# Patient Record
Sex: FEMALE | Race: WHITE | NOT HISPANIC OR LATINO | Employment: OTHER | URBAN - METROPOLITAN AREA
[De-identification: names, ages, dates, MRNs, and addresses within clinical notes are randomized per-mention and may not be internally consistent; named-entity substitution may affect disease eponyms.]

---

## 2017-02-04 ENCOUNTER — HOSPITAL ENCOUNTER (EMERGENCY)
Facility: HOSPITAL | Age: 82
Discharge: HOME | End: 2017-02-04
Attending: EMERGENCY MEDICINE
Payer: MEDICARE

## 2017-02-04 VITALS
WEIGHT: 190 LBS | DIASTOLIC BLOOD PRESSURE: 73 MMHG | TEMPERATURE: 97 F | BODY MASS INDEX: 34.96 KG/M2 | RESPIRATION RATE: 18 BRPM | HEART RATE: 85 BPM | SYSTOLIC BLOOD PRESSURE: 144 MMHG | OXYGEN SATURATION: 98 % | HEIGHT: 62 IN

## 2017-02-04 DIAGNOSIS — S01.01XA SCALP LACERATION, INITIAL ENCOUNTER: Primary | ICD-10-CM

## 2017-02-04 LAB
BASOPHILS # BLD AUTO: 0.1 K/UL
BASOPHILS NFR BLD: 0.9 %
DIFFERENTIAL METHOD: ABNORMAL
EOSINOPHIL # BLD AUTO: 0.2 K/UL
EOSINOPHIL NFR BLD: 1 %
ERYTHROCYTE [DISTWIDTH] IN BLOOD BY AUTOMATED COUNT: 15 %
HCT VFR BLD AUTO: 41.6 %
HGB BLD-MCNC: 13.4 G/DL
INR PPP: 3.1
LYMPHOCYTES # BLD AUTO: 3.1 K/UL
LYMPHOCYTES NFR BLD: 19.9 %
MCH RBC QN AUTO: 27.9 PG
MCHC RBC AUTO-ENTMCNC: 32.2 %
MCV RBC AUTO: 86 FL
MONOCYTES # BLD AUTO: 1.2 K/UL
MONOCYTES NFR BLD: 7.6 %
NEUTROPHILS # BLD AUTO: 11 K/UL
NEUTROPHILS NFR BLD: 70.6 %
PLATELET # BLD AUTO: 223 K/UL
PMV BLD AUTO: 9.4 FL
PROTHROMBIN TIME: 30.9 SEC
RBC # BLD AUTO: 4.81 M/UL
WBC # BLD AUTO: 15.5 K/UL

## 2017-02-04 PROCEDURE — 25000003 PHARM REV CODE 250

## 2017-02-04 PROCEDURE — 90471 IMMUNIZATION ADMIN: CPT | Performed by: EMERGENCY MEDICINE

## 2017-02-04 PROCEDURE — 85025 COMPLETE CBC W/AUTO DIFF WBC: CPT

## 2017-02-04 PROCEDURE — 63600175 PHARM REV CODE 636 W HCPCS: Performed by: EMERGENCY MEDICINE

## 2017-02-04 PROCEDURE — 99284 EMERGENCY DEPT VISIT MOD MDM: CPT | Mod: 25

## 2017-02-04 PROCEDURE — 85610 PROTHROMBIN TIME: CPT

## 2017-02-04 PROCEDURE — 36415 COLL VENOUS BLD VENIPUNCTURE: CPT

## 2017-02-04 PROCEDURE — 90715 TDAP VACCINE 7 YRS/> IM: CPT | Performed by: EMERGENCY MEDICINE

## 2017-02-04 PROCEDURE — 12001 RPR S/N/AX/GEN/TRNK 2.5CM/<: CPT

## 2017-02-04 RX ORDER — BACITRACIN ZINC 500 UNIT/G
OINTMENT IN PACKET (EA) TOPICAL 2 TIMES DAILY
Status: COMPLETED | OUTPATIENT
Start: 2017-02-04 | End: 2017-02-04

## 2017-02-04 RX ORDER — BACITRACIN ZINC 500 UNIT/G
OINTMENT IN PACKET (EA) TOPICAL
Status: COMPLETED
Start: 2017-02-04 | End: 2017-02-04

## 2017-02-04 RX ADMIN — BACITRACIN ZINC: 500 OINTMENT TOPICAL at 05:02

## 2017-02-04 RX ADMIN — CLOSTRIDIUM TETANI TOXOID ANTIGEN (FORMALDEHYDE INACTIVATED), CORYNEBACTERIUM DIPHTHERIAE TOXOID ANTIGEN (FORMALDEHYDE INACTIVATED), BORDETELLA PERTUSSIS TOXOID ANTIGEN (GLUTARALDEHYDE INACTIVATED), BORDETELLA PERTUSSIS FILAMENTOUS HEMAGGLUTININ ANTIGEN (FORMALDEHYDE INACTIVATED), BORDETELLA PERTUSSIS PERTACTIN ANTIGEN, AND BORDETELLA PERTUSSIS FIMBRIAE 2/3 ANTIGEN 0.5 ML: 5; 2; 2.5; 5; 3; 5 INJECTION, SUSPENSION INTRAMUSCULAR at 04:02

## 2017-02-04 RX ADMIN — Medication: at 05:02

## 2017-02-04 NOTE — ED AVS SNAPSHOT
[facility] [address] [place] [address]               Kathia Astudillo   2017  2:59 PM   ED    Description:  Female : 1/10/1926   Department:  [facility]           Your Care was Coordinated By:     Provider Role From To    Santiago Kuhn MD Attending Provider 17 1459 --      Reason for Visit     Head Laceration           Diagnoses this Visit        Comments    Scalp laceration, initial encounter    -  Primary       ED Disposition     ED Disposition Condition Comment    Discharge             To Do List           Follow-up Information     Follow up with Robel Maciel MD In 10 days.    Specialty:  Family Medicine    Why:  For suture removal    Contact information:    [address]  [facility] [address]  [phone]          Follow up with [facility].    Specialty:  Emergency Medicine    Why:  As needed, If symptoms worsen    Contact information:    [address] [place] [id number] [phone]      [facility]     [facility] Care [facility] -  Assistance  Registered nurses in the [facility] provide clinical advisement, health education, appointment booking, and other advisory services.  Call for this free service at [phone].             Medications           Message regarding Medications     Verify the changes and/or additions to your medication regime listed below are the same as discussed with your clinician today.  If any of these changes or additions are incorrect, please notify your healthcare provider.        These medications were administered today        Dose Freq    bacitracin zinc ointment  2 times daily    Sig: Apply topically 2 (two) times daily.    Class: Normal    Route: Topical (Top)    Tdap vaccine injection 0.5 mL 0.5 mL ED 1 Time    Sig: Inject 0.5 mLs into the muscle ED 1 Time.    Class: Normal    Route: Intramuscular    bacitracin zinc 500  "unit/gram ointment      Notes to Pharmacy: Created by cabinet override           Verify that the below list of medications is an accurate representation of the medications you are currently taking.  If none reported, the list may be blank. If incorrect, please contact your healthcare provider. Carry this list with you in case of emergency.           Current Medications     acetaminophen (TYLENOL) 325 MG tablet Take 2 tablets (650 mg total) by mouth every 6 (six) hours as needed for Temperature greater than (or equal to 101 degree F).    ADVAIR DISKUS 250-50 mcg/dose diskus inhaler     atenolol (TENORMIN) 25 MG tablet Take 1 tablet (25 mg total) by mouth once daily.    bacitracin zinc 500 unit/gram ointment     bacitracin zinc ointment Apply topically 2 (two) times daily.    docusate sodium (COLACE) 100 MG capsule Take 1 capsule (100 mg total) by mouth 2 (two) times daily.    donepezil (ARICEPT) 5 MG tablet Take 1 tablet (5 mg total) by mouth once daily.    fluticasone-vilanterol (BREO) 100-25 mcg/dose diskus inhaler Inhale 1 puff into the lungs once daily.    levothyroxine (SYNTHROID) 100 MCG tablet Take 100 mcg by mouth before breakfast.    ondansetron HCl, PF, 4 mg/2 mL Soln Inject 4 mg into the vein every 8 (eight) hours as needed.    pantoprazole (PROTONIX) 40 MG tablet Take 1 tablet (40 mg total) by mouth once daily.    senna-docusate 8.6-50 mg (PERICOLACE) 8.6-50 mg per tablet Take 1 tablet by mouth 2 (two) times daily as needed for Constipation.    warfarin (COUMADIN) 5 MG tablet Take 4.5 mg by mouth once daily. Recent increase to 7.5 mg           Clinical Reference Information           Your Vitals Were     BP Pulse Temp Resp Height Weight    144/73 85 97.4 °F (36.3 °C) (Oral) 18 5' 2" (1.575 m) 86.2 kg (190 lb)    Last Period SpO2 BMI          04[birth year]1950 (Approximate) 98% 34.75 kg/m2        Allergies as of 2/4/2017        Reactions    Pcn [Penicillins] Swelling    Demerol (Pf) [Meperidine (Pf)] Rash    " Ibuprofen Rash      Immunizations Administered on Date of Encounter - 2/4/2017     Name Date Dose VIS Date Route    TDAP 2/4/2017 0.5 mL 2/24/2015 Intramuscular      ED Micro, Lab, POCT     Start Ordered       Status Ordering Provider    02/04/17 1501 02/04/17 1500  Protime-INR  STAT      Final result     02/04/17 1501 02/04/17 1500  CBC auto differential  STAT      Final result       ED Imaging Orders     Start Ordered       Status Ordering Provider    02/04/17 1501 02/04/17 1500  CT Head Without Contrast  1 time imaging      Final result       Discharge References/Attachments     LACERATION, SCALP: SUTURES OR STAPLES (ENGLISH)      [facility] Sign-Up     Activating your [facility] account is as easy as 1-2-3!     1) Visit [email or web address], select Sign Up Now, enter this activation code and your date of birth, then select Next.  [email or web address]  Expires: 3/21/2017  5:21 PM      2) Create a username and password to use when you visit [facility] in the future and select a security question in case you lose your password and select Next.    3) Enter your e-mail address and click Sign Up!    Additional Information  If you have questions, please e-mail [email or web address] or call [phone] to talk to our [facility] staff. Remember, [facility] is NOT to be used for urgent needs. For medical emergencies, dial 911.          [facility] complies with applicable Federal civil rights laws and does not discriminate on the basis of race, color, national origin, age, disability, or sex.        Language Assistance Services     ATTENTION: Language assistance services are available, free of charge. Please call [phone].      ATENCIÓN: Si habla español, tiene a okeefe disposición servicios gratuitos de asistencia lingüística. Llame al [phone].     CHÚ Ý: N?u b?n nói Ti?ng Vi?t, có các d?ch v? h? tr? ngôn ng? mi?n phí dành cho b?n. G?i s? [phone].

## 2017-02-04 NOTE — ED NOTES
Posterior scalp laceration after slip and fall. Denies LOC. Bleeding controlled. Pt is ambulating without difficulty

## 2017-02-04 NOTE — ED PROVIDER NOTES
Encounter Date: 2/4/2017    SCRIBE #1 NOTE: IKayleigh, am scribing for, and in the presence of, Dr Kuhn.       History     Chief Complaint   Patient presents with    Head Laceration     slip and fall     Review of patient's allergies indicates:   Allergen Reactions    Pcn [penicillins] Swelling    Demerol (pf) [meperidine (pf)] Rash    Ibuprofen Rash     HPI Comments: 02/04/2017  3:27 PM     Chief Complaint: Head Laceration      Kathia Astudillo is a 89 y.o. female with a pmhx of Anticoagulant long-term use; Arthritis; Asthma;  Diverticulitis; DVT; Hypertension; and Thyroid disease presenting to the E.D. with an acute onset of a head laceration which occurred prior to arrival today. She suffered mechanical slip and fall when attempting to step out of the shower. Denies LOC. No other complaints and she denies hip pain, HA.  Pt has a past surgical history that includes Tonsillectomy; Gallbladder surgery; Hysterectomy; Shoulder surgery (Left); and Colon surgery.      The history is provided by the patient.     Past Medical History   Diagnosis Date    Anticoagulant long-term use     Arthritis     Asthma     Delayed wound healing     Diverticulitis      abscess that ruptured    DVT (deep venous thrombosis)     Hypertension     Thyroid disease      No past medical history pertinent negatives.  Past Surgical History   Procedure Laterality Date    Tonsillectomy      Gallbladder surgery      Hysterectomy      Shoulder surgery Left      rotator cuff repair    Colon surgery       colectomy with ostomy     Family History   Problem Relation Age of Onset    Collagen disease Neg Hx      Social History   Substance Use Topics    Smoking status: Never Smoker    Smokeless tobacco: Never Used    Alcohol use No     Review of Systems   Constitutional: Negative for fever.   HENT: Negative for sore throat.    Eyes: Negative for visual disturbance.   Respiratory: Negative for cough.    Cardiovascular: Negative for  chest pain.   Gastrointestinal: Negative for abdominal pain, diarrhea, nausea and vomiting.   Genitourinary: Negative for difficulty urinating and pelvic pain.   Musculoskeletal: Negative for arthralgias.   Skin: Positive for wound. Negative for rash.   Neurological: Negative for syncope, weakness and headaches.       Physical Exam   Initial Vitals   BP Pulse Resp Temp SpO2   02/04/17 1410 02/04/17 1410 02/04/17 1410 02/04/17 1410 02/04/17 1410   144/73 85 18 97.4 °F (36.3 °C) 98 %     Physical Exam    Nursing note and vitals reviewed.  Constitutional: She appears well-developed.   HENT:   Head: Normocephalic. Head is with laceration.   Mouth/Throat: Oropharynx is clear and moist.   1cm occipital scalp laceration. Galea is intact.    Eyes: Conjunctivae are normal.   Neck: Neck supple.   Cardiovascular: Normal rate, regular rhythm and intact distal pulses. Exam reveals no gallop and no friction rub.    Murmur heard.   Systolic murmur is present   Pulmonary/Chest: Breath sounds normal. She has no wheezes. She has no rhonchi. She has no rales.   Abdominal: Soft. She exhibits no distension. There is no tenderness.   Musculoskeletal: Normal range of motion.   Neurological: She is alert and oriented to person, place, and time.   Skin: No rash noted. No erythema.   Psychiatric: She has a normal mood and affect.         ED Course   Lac Repair  Date/Time: 2/4/2017 5:01 PM  Performed by: JONI ALSTON.  Authorized by: JONI ALSTON.   Consent Done: Yes  Risks and benefits: risks, benefits and alternatives were discussed  Body area: head/neck  Location details: scalp  Laceration length: 2 cm  Preparation: Patient was prepped and draped in the usual sterile fashion.  Amount of cleaning: standard  Skin closure: staples  Number of sutures: 3  Patient tolerance: Patient tolerated the procedure well with no immediate complications        Labs Reviewed   PROTIME-INR - Abnormal; Notable for the following:        Result Value     Prothrombin Time 30.9 (*)     INR 3.1 (*)     All other components within normal limits   CBC W/ AUTO DIFFERENTIAL - Abnormal; Notable for the following:     WBC 15.50 (*)     RDW 15.0 (*)     Gran # 11.0 (*)     Mono # 1.2 (*)     All other components within normal limits             Medical Decision Making:   History:   Old Medical Records: I decided to obtain old medical records.  Initial Assessment:   89-year-old woman with mechanical fall and posterior head trauma with scalp laceration.  CT head negative.  No neck tenderness or pain.  Normal neurological exam.  Scalp was repaired with 3 staples.  Hemostasis achieved.  Patient to apply antibiotic ointment.  Wound care precautions discussed.  Patient to return for any worsening symptoms.  Follow up in 10 days for suture removal.            Scribe Attestation:   Scribe #1: I performed the above scribed service and the documentation accurately describes the services I performed. I attest to the accuracy of the note.    Attending Attestation:           Physician Attestation for Scribe:  Physician Attestation Statement for Scribe #1: I, Dr Kuhn, reviewed documentation, as scribed by Kayleigh Zamarripa in my presence, and it is both accurate and complete.                 ED Course     Clinical Impression:   The encounter diagnosis was Scalp laceration, initial encounter.          Santiago Kuhn MD  02/04/17 1167

## 2017-08-09 ENCOUNTER — OFFICE VISIT (OUTPATIENT)
Dept: ORTHOPEDICS | Facility: CLINIC | Age: 82
End: 2017-08-09
Payer: MEDICARE

## 2017-08-09 VITALS
DIASTOLIC BLOOD PRESSURE: 74 MMHG | SYSTOLIC BLOOD PRESSURE: 128 MMHG | HEIGHT: 62 IN | WEIGHT: 190 LBS | HEART RATE: 118 BPM | BODY MASS INDEX: 34.96 KG/M2

## 2017-08-09 DIAGNOSIS — M48.061 LUMBAR SPINAL STENOSIS: Primary | ICD-10-CM

## 2017-08-09 DIAGNOSIS — M54.32 SCIATICA OF LEFT SIDE WITHOUT BACK PAIN: Primary | ICD-10-CM

## 2017-08-09 DIAGNOSIS — M48.061 SPINAL STENOSIS OF LUMBAR REGION WITH RADICULOPATHY: ICD-10-CM

## 2017-08-09 DIAGNOSIS — M54.32 LEFT SIDED SCIATICA: ICD-10-CM

## 2017-08-09 DIAGNOSIS — M54.16 SPINAL STENOSIS OF LUMBAR REGION WITH RADICULOPATHY: ICD-10-CM

## 2017-08-09 PROCEDURE — 3008F BODY MASS INDEX DOCD: CPT | Mod: ,,, | Performed by: ORTHOPAEDIC SURGERY

## 2017-08-09 PROCEDURE — 99203 OFFICE O/P NEW LOW 30 MIN: CPT | Mod: ,,, | Performed by: ORTHOPAEDIC SURGERY

## 2017-08-09 PROCEDURE — 1159F MED LIST DOCD IN RCRD: CPT | Mod: ,,, | Performed by: ORTHOPAEDIC SURGERY

## 2017-08-09 PROCEDURE — 1157F ADVNC CARE PLAN IN RCRD: CPT | Mod: ,,, | Performed by: ORTHOPAEDIC SURGERY

## 2017-08-09 RX ORDER — ATENOLOL 25 MG/1
1 TABLET ORAL DAILY
COMMUNITY
Start: 2017-06-26

## 2017-08-09 RX ORDER — METHYLPREDNISOLONE 4 MG/1
TABLET ORAL
Qty: 1 PACKAGE | Refills: 0 | Status: SHIPPED | OUTPATIENT
Start: 2017-08-09 | End: 2018-07-09

## 2017-08-09 NOTE — PROGRESS NOTES
Subjective:       Chief Complaint    Chief Complaint   Patient presents with    NP, Left Hip Pain     Pain started approx. in mid 7/2017 w/ no knowm trauma.  Previous eval & xray by Dr. Maciel on 8/3/17.  Made worse by any movement.  Dr. Maciel gave her a Toradol injection which helped for only a few hours. No groin pain or radiation down the leg.       HPI  Kathia Astudillo is a 89 y.o.  female who presents With three-week history of pain in her left hip area. First-time episode. Denies low back pain. No history of trauma. Pain level can reach 7/10.      Past History  Past Medical History:   Diagnosis Date    Anticoagulant long-term use     Arthritis     Asthma     Delayed wound healing     Diverticulitis     abscess that ruptured    DVT (deep venous thrombosis)     Hypertension     Thyroid disease      Past Surgical History:   Procedure Laterality Date    COLON SURGERY      colectomy with ostomy    GALLBLADDER SURGERY      HYSTERECTOMY      SHOULDER SURGERY Left     rotator cuff repair    TONSILLECTOMY       Social History     Social History    Marital status:      Spouse name: N/A    Number of children: N/A    Years of education: N/A     Occupational History    Not on file.     Social History Main Topics    Smoking status: Never Smoker    Smokeless tobacco: Never Used    Alcohol use No    Drug use: No    Sexual activity: Not on file     Other Topics Concern    Not on file     Social History Narrative    No narrative on file         Medications  Current Outpatient Prescriptions   Medication Sig    ADVAIR DISKUS 250-50 mcg/dose diskus inhaler Inhale 1 puff into the lungs once daily.     atenolol (TENORMIN) 25 MG tablet Take 1 tablet by mouth once daily.    DIPHENHYDRAMINE HCL (SLEEP ORAL) Take 1 tablet by mouth nightly as needed (Z-Quill).    levothyroxine (SYNTHROID) 100 MCG tablet Take 100 mcg by mouth before breakfast.    warfarin (COUMADIN) 5 MG tablet Take 4.5 mg by mouth  once daily. Recent increase to 7.5 mg     No current facility-administered medications for this visit.        Allergies  Review of patient's allergies indicates:   Allergen Reactions    Pcn [penicillins] Swelling    Demerol (pf) [meperidine (pf)] Rash    Ibuprofen Rash         Review of Systems     Constitutional: Negative    HENT: Negative  Eyes: Negative  Respiratory: Negative  Cardiovascular: Negative  Musculoskeletal: HPI  Skin: Negative  Neurological: Negative  Hematological: Negative  Endocrine: Negative      Physical Exam    Vitals:    08/09/17 0836   BP: 128/74   Pulse: (!) 118     Physical Examination:Lumbar spine examination reveals forward flexion 90° with knee sprain. Pulse for tender in the left gluteal sciatic notch area. Nontender paralumbar lumbosacral region. Mild tenderness in the right gluteal area. Unable to heel toe walk. Some tenderness over the trochanteric bursa. Quantity of tissue in the area. External rotation 35°, internal rotation 20°. Hip flexion past 90 in the seated position. Moderately weak dorsiflexors on the left. Good dorsiflexor strength on the right. At some mild discomfort with terminal internal rotation on the left.     Skin-clear  General appearance -  well appearing, and in no distress  Mental status - awake  Neck - supple  Chest -  symmetric air entry  Heart - normal rate   Abdomen - soft      Assessment/Plan   Sciatica of left side without back pain    Spinal stenosis of lumbar region with radiculopathy    X-ray examination of the left hip shows no evidence of fracture or dislocation. The cartilage space appears well-maintained. X-rays of the lumbar spine will be done at [facility] imaging today.    Medrol Dosepak prescribed. Advised not to ambulate without a walker in front of her. X-rays of the lumbar spine will be done at . imaging today.    This note was dictated using voice recognition software and may contain grammatical errors.

## 2018-07-09 ENCOUNTER — HOSPITAL ENCOUNTER (OUTPATIENT)
Facility: HOSPITAL | Age: 83
LOS: 1 days | Discharge: HOSPICE | End: 2018-07-11
Attending: EMERGENCY MEDICINE | Admitting: INTERNAL MEDICINE
Payer: MEDICARE

## 2018-07-09 DIAGNOSIS — A41.9 SEVERE SEPSIS: ICD-10-CM

## 2018-07-09 DIAGNOSIS — Z93.3 COLOSTOMY STATUS: ICD-10-CM

## 2018-07-09 DIAGNOSIS — M79.89 PAIN AND SWELLING OF LEFT LOWER LEG: Primary | ICD-10-CM

## 2018-07-09 DIAGNOSIS — M79.662 PAIN AND SWELLING OF LEFT LOWER LEG: Primary | ICD-10-CM

## 2018-07-09 DIAGNOSIS — E03.9 HYPOTHYROIDISM, UNSPECIFIED TYPE: ICD-10-CM

## 2018-07-09 DIAGNOSIS — L03.116 CELLULITIS OF LEFT LOWER EXTREMITY: ICD-10-CM

## 2018-07-09 DIAGNOSIS — R65.20 SEVERE SEPSIS: ICD-10-CM

## 2018-07-09 LAB
ALBUMIN SERPL BCP-MCNC: 3 G/DL
ALP SERPL-CCNC: 50 U/L
ALT SERPL W/O P-5'-P-CCNC: 20 U/L
ANION GAP SERPL CALC-SCNC: 11 MMOL/L
AST SERPL-CCNC: 28 U/L
BASOPHILS # BLD AUTO: 0 K/UL
BASOPHILS NFR BLD: 0 %
BILIRUB SERPL-MCNC: 1.3 MG/DL
BUN SERPL-MCNC: 19 MG/DL
CALCIUM SERPL-MCNC: 8.4 MG/DL
CHLORIDE SERPL-SCNC: 103 MMOL/L
CO2 SERPL-SCNC: 17 MMOL/L
CREAT SERPL-MCNC: 0.9 MG/DL
CRP SERPL-MCNC: 14.5 MG/L
DIFFERENTIAL METHOD: ABNORMAL
EOSINOPHIL # BLD AUTO: 0 K/UL
EOSINOPHIL NFR BLD: 0.1 %
ERYTHROCYTE [DISTWIDTH] IN BLOOD BY AUTOMATED COUNT: 16.5 %
EST. GFR  (AFRICAN AMERICAN): >60 ML/MIN/1.73 M^2
EST. GFR  (NON AFRICAN AMERICAN): 56 ML/MIN/1.73 M^2
GLUCOSE SERPL-MCNC: 100 MG/DL
HCT VFR BLD AUTO: 37.9 %
HGB BLD-MCNC: 12.3 G/DL
LACTATE SERPL-SCNC: 2.9 MMOL/L
LYMPHOCYTES # BLD AUTO: 0.6 K/UL
LYMPHOCYTES NFR BLD: 4.8 %
MCH RBC QN AUTO: 29.1 PG
MCHC RBC AUTO-ENTMCNC: 32.4 G/DL
MCV RBC AUTO: 90 FL
MONOCYTES # BLD AUTO: 0.5 K/UL
MONOCYTES NFR BLD: 4.3 %
NEUTROPHILS # BLD AUTO: 11.6 K/UL
NEUTROPHILS NFR BLD: 90.8 %
PLATELET # BLD AUTO: 124 K/UL
PMV BLD AUTO: 10 FL
POTASSIUM SERPL-SCNC: 4.7 MMOL/L
PROT SERPL-MCNC: 5.7 G/DL
RBC # BLD AUTO: 4.21 M/UL
SODIUM SERPL-SCNC: 131 MMOL/L
WBC # BLD AUTO: 12.8 K/UL

## 2018-07-09 PROCEDURE — 25000003 PHARM REV CODE 250: Performed by: EMERGENCY MEDICINE

## 2018-07-09 PROCEDURE — 96365 THER/PROPH/DIAG IV INF INIT: CPT

## 2018-07-09 PROCEDURE — 96367 TX/PROPH/DG ADDL SEQ IV INF: CPT

## 2018-07-09 PROCEDURE — 85610 PROTHROMBIN TIME: CPT

## 2018-07-09 PROCEDURE — 99285 EMERGENCY DEPT VISIT HI MDM: CPT | Mod: 25

## 2018-07-09 PROCEDURE — S0077 INJECTION, CLINDAMYCIN PHOSP: HCPCS | Performed by: EMERGENCY MEDICINE

## 2018-07-09 PROCEDURE — 87040 BLOOD CULTURE FOR BACTERIA: CPT | Mod: 59

## 2018-07-09 PROCEDURE — 80053 COMPREHEN METABOLIC PANEL: CPT

## 2018-07-09 PROCEDURE — 85025 COMPLETE CBC W/AUTO DIFF WBC: CPT

## 2018-07-09 PROCEDURE — 36415 COLL VENOUS BLD VENIPUNCTURE: CPT

## 2018-07-09 PROCEDURE — 86140 C-REACTIVE PROTEIN: CPT

## 2018-07-09 PROCEDURE — 83605 ASSAY OF LACTIC ACID: CPT

## 2018-07-09 RX ORDER — CLINDAMYCIN PHOSPHATE 600 MG/50ML
600 INJECTION, SOLUTION INTRAVENOUS
Status: COMPLETED | OUTPATIENT
Start: 2018-07-09 | End: 2018-07-10

## 2018-07-09 RX ADMIN — CLINDAMYCIN IN 5 PERCENT DEXTROSE 600 MG: 12 INJECTION, SOLUTION INTRAVENOUS at 11:07

## 2018-07-09 RX ADMIN — SODIUM CHLORIDE 1000 ML: 0.9 INJECTION, SOLUTION INTRAVENOUS at 11:07

## 2018-07-10 PROBLEM — Z66 DNR (DO NOT RESUSCITATE): Status: ACTIVE | Noted: 2018-07-10

## 2018-07-10 PROBLEM — Z91.81 HISTORY OF FALLING: Status: ACTIVE | Noted: 2018-07-10

## 2018-07-10 PROBLEM — Z86.718 HISTORY OF DVT (DEEP VEIN THROMBOSIS): Status: ACTIVE | Noted: 2018-07-10

## 2018-07-10 PROBLEM — M79.662 PAIN AND SWELLING OF LEFT LOWER LEG: Status: ACTIVE | Noted: 2018-07-10

## 2018-07-10 PROBLEM — A41.9 SEPSIS: Status: ACTIVE | Noted: 2018-07-10

## 2018-07-10 PROBLEM — I50.9 CHF (CONGESTIVE HEART FAILURE): Status: ACTIVE | Noted: 2018-07-10

## 2018-07-10 PROBLEM — M79.89 PAIN AND SWELLING OF LEFT LOWER LEG: Status: ACTIVE | Noted: 2018-07-10

## 2018-07-10 PROBLEM — L03.116 CELLULITIS OF LEFT LOWER EXTREMITY: Status: ACTIVE | Noted: 2018-07-10

## 2018-07-10 LAB
ALBUMIN SERPL BCP-MCNC: 2.7 G/DL
ALP SERPL-CCNC: 45 U/L
ALT SERPL W/O P-5'-P-CCNC: 18 U/L
ANION GAP SERPL CALC-SCNC: 6 MMOL/L
AST SERPL-CCNC: 22 U/L
BASOPHILS # BLD AUTO: 0 K/UL
BASOPHILS NFR BLD: 0.3 %
BILIRUB SERPL-MCNC: 1.2 MG/DL
BUN SERPL-MCNC: 18 MG/DL
CALCIUM SERPL-MCNC: 8.2 MG/DL
CHLORIDE SERPL-SCNC: 102 MMOL/L
CO2 SERPL-SCNC: 24 MMOL/L
CREAT SERPL-MCNC: 0.9 MG/DL
DIFFERENTIAL METHOD: ABNORMAL
EOSINOPHIL # BLD AUTO: 0 K/UL
EOSINOPHIL NFR BLD: 0.3 %
ERYTHROCYTE [DISTWIDTH] IN BLOOD BY AUTOMATED COUNT: 16 %
EST. GFR  (AFRICAN AMERICAN): >60 ML/MIN/1.73 M^2
EST. GFR  (NON AFRICAN AMERICAN): 56 ML/MIN/1.73 M^2
GLUCOSE SERPL-MCNC: 126 MG/DL
HCT VFR BLD AUTO: 31.9 %
HGB BLD-MCNC: 10.5 G/DL
INR PPP: 2.2
INR PPP: 2.4
LACTATE SERPL-SCNC: 0.9 MMOL/L
LYMPHOCYTES # BLD AUTO: 0.8 K/UL
LYMPHOCYTES NFR BLD: 5.9 %
MAGNESIUM SERPL-MCNC: 1.7 MG/DL
MCH RBC QN AUTO: 29.4 PG
MCHC RBC AUTO-ENTMCNC: 32.8 G/DL
MCV RBC AUTO: 90 FL
MONOCYTES # BLD AUTO: 0.5 K/UL
MONOCYTES NFR BLD: 3.7 %
NEUTROPHILS # BLD AUTO: 12.2 K/UL
NEUTROPHILS NFR BLD: 89.8 %
PHOSPHATE SERPL-MCNC: 3.5 MG/DL
PLATELET # BLD AUTO: 122 K/UL
PMV BLD AUTO: 9.1 FL
POTASSIUM SERPL-SCNC: 4.2 MMOL/L
PROT SERPL-MCNC: 5.4 G/DL
PROTHROMBIN TIME: 21.7 SEC
PROTHROMBIN TIME: 24.5 SEC
RBC # BLD AUTO: 3.56 M/UL
SODIUM SERPL-SCNC: 132 MMOL/L
TSH SERPL DL<=0.005 MIU/L-ACNC: 1.65 UIU/ML
WBC # BLD AUTO: 13.5 K/UL

## 2018-07-10 PROCEDURE — 27000221 HC OXYGEN, UP TO 24 HOURS

## 2018-07-10 PROCEDURE — 84100 ASSAY OF PHOSPHORUS: CPT

## 2018-07-10 PROCEDURE — 36415 COLL VENOUS BLD VENIPUNCTURE: CPT

## 2018-07-10 PROCEDURE — 99900035 HC TECH TIME PER 15 MIN (STAT)

## 2018-07-10 PROCEDURE — 63600175 PHARM REV CODE 636 W HCPCS: Performed by: EMERGENCY MEDICINE

## 2018-07-10 PROCEDURE — 85025 COMPLETE CBC W/AUTO DIFF WBC: CPT

## 2018-07-10 PROCEDURE — G0378 HOSPITAL OBSERVATION PER HR: HCPCS

## 2018-07-10 PROCEDURE — 84443 ASSAY THYROID STIM HORMONE: CPT

## 2018-07-10 PROCEDURE — S0077 INJECTION, CLINDAMYCIN PHOSP: HCPCS | Performed by: NURSE PRACTITIONER

## 2018-07-10 PROCEDURE — 25000003 PHARM REV CODE 250: Performed by: EMERGENCY MEDICINE

## 2018-07-10 PROCEDURE — 25000003 PHARM REV CODE 250: Performed by: NURSE PRACTITIONER

## 2018-07-10 PROCEDURE — 83735 ASSAY OF MAGNESIUM: CPT

## 2018-07-10 PROCEDURE — 83605 ASSAY OF LACTIC ACID: CPT

## 2018-07-10 PROCEDURE — 80053 COMPREHEN METABOLIC PANEL: CPT

## 2018-07-10 PROCEDURE — 99220 PR INITIAL OBSERVATION CARE,LEVL III: CPT | Mod: GW,,, | Performed by: INTERNAL MEDICINE

## 2018-07-10 PROCEDURE — 85610 PROTHROMBIN TIME: CPT

## 2018-07-10 PROCEDURE — 94761 N-INVAS EAR/PLS OXIMETRY MLT: CPT

## 2018-07-10 RX ORDER — CLINDAMYCIN PHOSPHATE 600 MG/50ML
600 INJECTION, SOLUTION INTRAVENOUS
Status: DISCONTINUED | OUTPATIENT
Start: 2018-07-10 | End: 2018-07-11 | Stop reason: HOSPADM

## 2018-07-10 RX ORDER — SODIUM CHLORIDE 0.9 % (FLUSH) 0.9 %
5 SYRINGE (ML) INJECTION
Status: DISCONTINUED | OUTPATIENT
Start: 2018-07-10 | End: 2018-07-11 | Stop reason: HOSPADM

## 2018-07-10 RX ORDER — IPRATROPIUM BROMIDE AND ALBUTEROL SULFATE 2.5; .5 MG/3ML; MG/3ML
3 SOLUTION RESPIRATORY (INHALATION) EVERY 4 HOURS PRN
Status: DISCONTINUED | OUTPATIENT
Start: 2018-07-10 | End: 2018-07-11 | Stop reason: HOSPADM

## 2018-07-10 RX ORDER — ACETAMINOPHEN 325 MG/1
650 TABLET ORAL EVERY 6 HOURS PRN
Status: DISCONTINUED | OUTPATIENT
Start: 2018-07-10 | End: 2018-07-11 | Stop reason: HOSPADM

## 2018-07-10 RX ORDER — ATENOLOL 25 MG/1
25 TABLET ORAL DAILY
Status: DISCONTINUED | OUTPATIENT
Start: 2018-07-10 | End: 2018-07-11 | Stop reason: HOSPADM

## 2018-07-10 RX ORDER — ONDANSETRON 4 MG/1
8 TABLET, ORALLY DISINTEGRATING ORAL EVERY 8 HOURS PRN
Status: DISCONTINUED | OUTPATIENT
Start: 2018-07-10 | End: 2018-07-11 | Stop reason: HOSPADM

## 2018-07-10 RX ORDER — LEVOTHYROXINE SODIUM 100 UG/1
100 TABLET ORAL
Status: DISCONTINUED | OUTPATIENT
Start: 2018-07-10 | End: 2018-07-11 | Stop reason: HOSPADM

## 2018-07-10 RX ORDER — AMOXICILLIN 250 MG
1 CAPSULE ORAL 2 TIMES DAILY PRN
Status: DISCONTINUED | OUTPATIENT
Start: 2018-07-10 | End: 2018-07-11 | Stop reason: HOSPADM

## 2018-07-10 RX ORDER — RAMELTEON 8 MG/1
8 TABLET ORAL NIGHTLY PRN
Status: DISCONTINUED | OUTPATIENT
Start: 2018-07-10 | End: 2018-07-11 | Stop reason: HOSPADM

## 2018-07-10 RX ADMIN — LEVOTHYROXINE SODIUM 100 MCG: 100 TABLET ORAL at 05:07

## 2018-07-10 RX ADMIN — WARFARIN SODIUM 3 MG: 2 TABLET ORAL at 06:07

## 2018-07-10 RX ADMIN — VANCOMYCIN HYDROCHLORIDE 2000 MG: 1 INJECTION, POWDER, LYOPHILIZED, FOR SOLUTION INTRAVENOUS at 12:07

## 2018-07-10 RX ADMIN — CLINDAMYCIN IN 5 PERCENT DEXTROSE 600 MG: 12 INJECTION, SOLUTION INTRAVENOUS at 11:07

## 2018-07-10 RX ADMIN — CLINDAMYCIN IN 5 PERCENT DEXTROSE 600 MG: 12 INJECTION, SOLUTION INTRAVENOUS at 05:07

## 2018-07-10 RX ADMIN — CLINDAMYCIN IN 5 PERCENT DEXTROSE 600 MG: 12 INJECTION, SOLUTION INTRAVENOUS at 10:07

## 2018-07-10 RX ADMIN — ATENOLOL 25 MG: 25 TABLET ORAL at 09:07

## 2018-07-10 RX ADMIN — CLINDAMYCIN IN 5 PERCENT DEXTROSE 600 MG: 12 INJECTION, SOLUTION INTRAVENOUS at 06:07

## 2018-07-10 NOTE — PLAN OF CARE
Problem: Patient Care Overview  Goal: Plan of Care Review  Outcome: Ongoing (interventions implemented as appropriate)  Pt alert and oriented. Vitals stable. Colostomy. Family at bedside. Free from fall. Left lower leg weeping,painfree.  Has no complaints at this time. Will continue to monitor.

## 2018-07-10 NOTE — PLAN OF CARE
I attempted to complete the discharge assessment however the pt is currently receiving dialysis. CM will return to follow up. Peggy Chavez LMSW      07/10/18 1159   Discharge Assessment   Assessment Type Discharge Planning Assessment

## 2018-07-10 NOTE — ED NOTES
Femoral stick done by Dr. Lobato due to multiple unsuccessful IV attempts for blood work. Pt tolerated well, bleeding controlled with gauze.

## 2018-07-10 NOTE — ASSESSMENT & PLAN NOTE
- hx of CHF and aortic stenosis  - recently placed on hospice 4 wks ago r/t her CHF  - denies CP or respiratory complaints at this time  - received 1 L of NS while in ED - will hold additional fluids at this time given cardiac status   - monitor fluid status closely  - strict I/O's  - daily Wt's

## 2018-07-10 NOTE — ED PROVIDER NOTES
Encounter Date: 7/9/2018    SCRIBE #1 NOTE: I, Hernandez Mark, am scribing for, and in the presence of, Dr. Lobato.       History     Chief Complaint   Patient presents with    Leg Problem     discoloration to lower left leg that started 4 hours ago. Pt on hospice for CHF. Has been in touch with hospice and told to come here. Hospice nurse to meet them here. Pt is on coumadin and took 3 mgs today as last dose. Given at 8:30pm       07/09/2018 9:38 PM     Chief complaint: L lower leg color change      Kathia Astudillo is a 89 y.o. female with a past medical history of HTN, thyroid disease, delayed wound healing, and DVT presenting to the ED with a sudden onset of an acute L lower leg color change beginning 6 hrs ago. The relative reported hospice for CHF and only wants INR and wants to be informed prior to moving forward. The pt reported her skin started off as a light pink which has progressively darkened. She stated symptoms began when she was lying in bed. The pt noted she has pain when attempting to bear weight on L leg. Associated symptom of L lower leg swelling. The pt denied history of cellulitis and fever. The relative reported the pt was evaluated for leg swelling 3 months ago with an US of her legs showing no signs of a blood clot. The pt stated she is in no current pain. The relative noted she gave the pt .25mg of morphine PTA.       The history is provided by the patient and a relative.     Review of patient's allergies indicates:   Allergen Reactions    Pcn [penicillins] Swelling    Demerol (pf) [meperidine (pf)] Rash    Ibuprofen Rash     Past Medical History:   Diagnosis Date    Anticoagulant long-term use     Arthritis     Asthma     CHF (congestive heart failure)     Delayed wound healing     Diverticulitis     abscess that ruptured    DVT (deep venous thrombosis)     Hypertension     Thyroid disease      Past Surgical History:   Procedure Laterality Date    COLON SURGERY      colectomy with  ostomy    GALLBLADDER SURGERY      SHIRAZ FILTER PLACEMENT      HYSTERECTOMY      SHOULDER SURGERY Left     rotator cuff repair    TONSILLECTOMY       Family History   Problem Relation Age of Onset    Collagen disease Neg Hx      Social History   Substance Use Topics    Smoking status: Never Smoker    Smokeless tobacco: Never Used    Alcohol use No     Review of Systems   Constitutional: Negative for fever.   HENT: Negative for congestion.    Eyes: Negative for visual disturbance.   Respiratory: Negative for wheezing.    Cardiovascular: Positive for leg swelling (L lower leg). Negative for chest pain.   Gastrointestinal: Negative for abdominal pain.   Genitourinary: Negative for dysuria.   Musculoskeletal: Negative for joint swelling.   Skin: Positive for color change (L lower leg). Negative for rash.   Neurological: Negative for syncope.   Hematological: Does not bruise/bleed easily.   Psychiatric/Behavioral: Negative for confusion.       Physical Exam     Initial Vitals [07/09/18 2127]   BP Pulse Resp Temp SpO2   (!) 121/59 92 (!) 24 98.1 °F (36.7 °C) (!) 93 %      MAP       --         Physical Exam    Nursing note and vitals reviewed.  Constitutional: She appears well-nourished.   Morbidly obese   HENT:   Head: Normocephalic and atraumatic.   Eyes: Conjunctivae and EOM are normal.   Neck: Normal range of motion. Neck supple. No thyroid mass present.   Cardiovascular: Regular rhythm, normal heart sounds and intact distal pulses. Tachycardia present.  Exam reveals no gallop and no friction rub.    No murmur heard.  Pulmonary/Chest: Breath sounds normal. She has no wheezes. She has no rhonchi. She has no rales.   Abdominal: Soft. Normal appearance and bowel sounds are normal. There is no tenderness.   Neurological: She is alert and oriented to person, place, and time. She has normal strength. No cranial nerve deficit or sensory deficit.   Skin: Skin is warm and dry. No rash noted. No erythema.    Circumferential stocking dark, hot erythema  Very warm LE     Psychiatric: She has a normal mood and affect. Her speech is normal. Cognition and memory are normal.         ED Course   Procedures  Labs Reviewed   PROTIME-INR - Abnormal; Notable for the following:        Result Value    Prothrombin Time 21.7 (*)     INR 2.2 (*)     All other components within normal limits   CBC W/ AUTO DIFFERENTIAL - Abnormal; Notable for the following:     WBC 12.80 (*)     RDW 16.5 (*)     Platelets 124 (*)     Gran # (ANC) 11.6 (*)     Lymph # 0.6 (*)     Gran% 90.8 (*)     Lymph% 4.8 (*)     All other components within normal limits   LACTIC ACID, PLASMA - Abnormal; Notable for the following:     Lactate (Lactic Acid) 2.9 (*)     All other components within normal limits   COMPREHENSIVE METABOLIC PANEL - Abnormal; Notable for the following:     Sodium 131 (*)     CO2 17 (*)     Calcium 8.4 (*)     Total Protein 5.7 (*)     Albumin 3.0 (*)     Total Bilirubin 1.3 (*)     Alkaline Phosphatase 50 (*)     eGFR if non  56 (*)     All other components within normal limits   C-REACTIVE PROTEIN - Abnormal; Notable for the following:     CRP 14.5 (*)     All other components within normal limits   CULTURE, BLOOD   CULTURE, BLOOD          Imaging Results          X-Ray Tibia Fibula 2 View Left (In process)                  Medical Decision Making:   History:   Old Medical Records: I decided to obtain old medical records.  Clinical Tests:   Lab Tests: Ordered and Reviewed  Radiological Study: Reviewed and Ordered  ED Management:  This patient was interviewed and examined emergently in the presence of her son and daughter.  At this time she appears to have a rapidly evolving left lower extremity cellulitis.  Lab significant for evidence of progressing severe sepsis with a lactate elevation, greater than 2 (not above 4 & with stable blood pressures).  Blood cultures are pending and the patient was started on vancomycin  additionally with clindamycin for coverage of gas producing organisms.  No gas is noted on radiograph at this time but I have concern for quick evolving deep soft tissue necrotizing infection considering the rapidity of which the cellulitis has emerged and advanced.  This patient is not a surgical candidate secondary to very advanced aortic stenosis.  She is a DNR DNI.  Case was discussed with Mr. johnson who agreed to admit the patient.  She will be transferred to a telemetry bed in guarded condition.            Scribe Attestation:   Scribe #1: I performed the above scribed service and the documentation accurately describes the services I performed. I attest to the accuracy of the note.    Attending Attestation:         Attending Critical Care:   Critical Care Times:   Direct Patient Care (initial evaluation, reassessments, and time considering the case)................................................................20 minutes.   Additional History from reviewing old medical records or taking additional history from the family, EMS, PCP, etc.......................10 minutes.   Ordering, Reviewing, and Interpreting Diagnostic Studies...............................................................................................................5 minutes.   Documentation..................................................................................................................................................................................5 minutes.   Consultation with other Physicians. .................................................................................................................................................0 minutes.   Consultation with the patient's family directly relating to the patient's condition, care, and DNR status (when patient unable)......10 minutes.    Other..................................................................................................................................................................................................5 minutes.   ==============================================================  · Total Critical Care Time - exclusive of procedural time: 55 minutes.  ==============================================================  Critical care was necessary to treat or prevent imminent or life-threatening deterioration of the following conditions: sepsis.   The following critical care procedures were done by me (see procedure notes): pulse oximetry.   Critical care was time spent personally by me on the following activities: obtaining history from patient or relative, review of old charts, examination of patient, ordering lab, x-rays, and/or EKG, ordering and performing treatments and interventions, evaluation of patient's response to treatment, development of treatment plan with patient or relative, discussions with primary provider, re-evaluation of patient's conition and end of life discussions.   Critical Care Condition: potentially life-threatening       I, Dr. Fabrice Lobato, personally performed the services described in this documentation. All medical record entries made by the scribe were at my direction and in my presence.  I have reviewed the chart and agree that the record reflects my personal performance and is accurate and complete. Fabrice Lobato MD.  5:43 AM 07/10/2018             Clinical Impression:   The primary encounter diagnosis was Sepsis, due to unspecified organism. Diagnoses of Pain and swelling of left lower leg and Cellulitis of left lower extremity were also pertinent to this visit.      Disposition:   Disposition: Admitted  Condition: Serious                        Fabrice Lobato MD  07/10/18 0543       Fabrice Lobato MD  07/10/18 0545

## 2018-07-10 NOTE — H&P
[facility] Hospital Medicine  History & Physical    Patient Name: Kathia Astudillo  MRN: [id number]  Admission Date: 7/9/2018  Attending Physician: Sindy Mike MD   Primary Care Provider: Robel Maciel MD         Patient information was obtained from patient, relative(s) and ER records.     Subjective:     Principal Problem:Cellulitis of left lower extremity    Chief Complaint:   Chief Complaint   Patient presents with    Leg Problem     discoloration to lower left leg that started 4 hours ago. Pt on hospice for CHF. Has been in touch with hospice and told to come here. Hospice nurse to meet them here. Pt is on coumadin and took 3 mgs today as last dose. Given at 8:30pm        HPI: Pleasant 89 y/o female, who presents to the ED with her family d/t LLE swelling and color change.  She has a PMH of CHF, aortic stenosis, HTN, hypothyroidism, asthma, and DVT.  Family states that LLE has been swollen and weeping for the past few months.  Her daughter reports that they has an U/S performed which was negative for clot.  Today her daughter reports that her LLE was noted to be pink prior to her laying down for a nap.  Upon awaking roughly 3 hours later the LLE had gone from a pink to a xavier appearance.  They contacted her Hospice nurse who recommended going to the ED for evaluation.  She has been on hospice for the past 4 weeks r/t her CHF. She denies pain at this time, fever, chills, CP, worsening SOB, N/V/D, or  symptoms. Initial workup reveals afebrile, mild leukocytosis (12.8), lactic 2.9, Na 131, flat plate imaging suggestive of diffuse soft tissue edema of the LLE.  Blood cultures were obtained and she was started empirically on vancomycin and clindamycin for cellulitis of the LLE.       Past Medical History:   Diagnosis Date    Anticoagulant long-term use     Arthritis     Asthma     CHF (congestive heart failure)     Delayed wound healing     Diverticulitis     abscess that ruptured     DVT (deep venous thrombosis)     Hypertension     Thyroid disease        Past Surgical History:   Procedure Laterality Date    COLON SURGERY      colectomy with ostomy    GALLBLADDER SURGERY      SHIRAZ FILTER PLACEMENT      HYSTERECTOMY      SHOULDER SURGERY Left     rotator cuff repair    TONSILLECTOMY         Review of patient's allergies indicates:   Allergen Reactions    Pcn [penicillins] Swelling    Demerol (pf) [meperidine (pf)] Rash    Ibuprofen Rash       No current facility-administered medications on file prior to encounter.      Current Outpatient Prescriptions on File Prior to Encounter   Medication Sig    atenolol (TENORMIN) 25 MG tablet Take 1 tablet by mouth once daily.    DIPHENHYDRAMINE HCL (SLEEP ORAL) Take 1 tablet by mouth nightly as needed (Z-Quill).    levothyroxine (SYNTHROID) 100 MCG tablet Take 100 mcg by mouth before breakfast.    warfarin (COUMADIN) 5 MG tablet Take 3 mg by mouth once daily. Recent increase to 7.5 mg     Family History     No pertinent family medical history         Social History Main Topics    Smoking status: Never Smoker    Smokeless tobacco: Never Used    Alcohol use No    Drug use: No    Sexual activity: Not on file     Review of Systems   Constitutional: Negative for chills and fever.   HENT: Negative for congestion and sore throat.    Eyes: Negative for discharge and visual disturbance.   Respiratory: Negative for cough and chest tightness.    Cardiovascular: Positive for leg swelling. Negative for chest pain and palpitations.   Gastrointestinal: Negative for abdominal pain, diarrhea, nausea and vomiting.   Genitourinary: Negative for dysuria and frequency.   Musculoskeletal: Positive for gait problem and myalgias.   Skin: Positive for color change. Negative for rash.   Neurological: Negative for seizures and syncope.     Objective:     Vital Signs (Most Recent):  Temp: 98.1 °F (36.7 °C) (07/09/18 2127)  Pulse: 76 (07/10/18 0032)  Resp: (!)  24 (07/09/18 2127)  BP: (!) 100/57 (07/10/18 0032)  SpO2: 100 % (07/10/18 0032) Vital Signs (24h Range):  Temp:  [98.1 °F (36.7 °C)] 98.1 °F (36.7 °C)  Pulse:  [76-92] 76  Resp:  [24] 24  SpO2:  [93 %-100 %] 100 %  BP: (100-121)/(57-59) 100/57     Weight: 100.7 kg (222 lb)  Body mass index is 40.6 kg/m².    Physical Exam   Constitutional: She is oriented to person, place, and time. She appears well-developed and well-nourished. No distress. Nasal cannula in place.   HENT:   Head: Normocephalic and atraumatic.   Eyes: EOM are normal. Pupils are equal, round, and reactive to light.   Neck: Normal range of motion. Neck supple.   Cardiovascular: Normal rate, regular rhythm and intact distal pulses.    Murmur heard.  Pulmonary/Chest: Effort normal and breath sounds normal. No respiratory distress.   Abdominal: Soft. Bowel sounds are normal. She exhibits no distension. There is no tenderness.   Ostomy noted   Musculoskeletal: Normal range of motion. She exhibits edema.   LLE weeping, warm to the touch, edematous, with erythema from foot to mid shin    Neurological: She is alert and oriented to person, place, and time.   Skin: Capillary refill takes more than 3 seconds. She is not diaphoretic. There is erythema.   Cool extremities, dusky nail beds     Psychiatric: She has a normal mood and affect. Her behavior is normal.   Nursing note and vitals reviewed.    CRANIAL NERVES     CN III, IV, VI   Pupils are equal, round, and reactive to light.  Extraocular motions are normal.        Significant Labs:   CBC:   Recent Labs  Lab 07/09/18  2231   WBC 12.80*   HGB 12.3   HCT 37.9   *     CMP:   Recent Labs  Lab 07/09/18  2310   *   K 4.7      CO2 17*      BUN 19   CREATININE 0.9   CALCIUM 8.4*   PROT 5.7*   ALBUMIN 3.0*   BILITOT 1.3*   ALKPHOS 50*   AST 28   ALT 20   ANIONGAP 11   EGFRNONAA 56*     Coagulation:   Recent Labs  Lab 07/09/18  2330   INR 2.2*     Lactic Acid:   Recent Labs  Lab  07/09/18  2231   LACTATE 2.9*       Significant Imaging: I have reviewed all pertinent imaging results/findings within the past 24 hours.    Assessment/Plan:     * Cellulitis of left lower extremity    - presents with sudden color change to chronically edematous LLE  - afebrile, mild leukocytosis, lactic 2.9, XR suggestive of diffuse soft tissue swelling   - started empirically on vancomycin and clindamycin while in ED  - continue ABX for now - pharmacy to assist with dosing   - f/u on blood cultures and adjust therapy as indicated   - received 1L NS while in ED - repeat lactic pending   - f/u on lactic level and consider additional cautious fluid resuscitation given cardiac status   - elevate LLE while in bed  - PRN pain management    - monitor site closely and consider expert consultation if indicated         CHF (congestive heart failure)    - hx of CHF and aortic stenosis  - recently placed on hospice 4 wks ago r/t her CHF  - denies CP or respiratory complaints at this time  - received 1 L of NS while in ED - will hold additional fluids at this time given cardiac status   - monitor fluid status closely  - strict I/O's  - daily Wt's        DNR (do not resuscitate)    - recently placed under hospice care 4 weeks ago r/t her CHF  - code status and resuscitation goals discussed   - DNR order placed per request         History of DVT (deep vein thrombosis)    - continue home warfarin dosing   - daily PT/INR        Hypertension    - SBP ranging between 100-126  - monitor         Asthma    - duo nebs PRN SOB / wheezing  - supplemental oxygen as needed to maintain saturations greater than 92%        Colostomy status    - monitor ostomy output  - routine ostomy care         Hypothyroid    - TSH pending   - resume home levothyroxine dosing for now         History of falling    - high risk of fall / injury - utilize all safety measures and fall precautions           VTE Risk Mitigation         Ordered     warfarin tablet 3  mg  Daily      07/10/18 0055             Koko Lomabrdo NP  Department of Hospital Medicine   [facility]

## 2018-07-10 NOTE — PROGRESS NOTES
07/10/18 0847   Patient Assessment/Suction   Level of Consciousness (AVPU) alert   All Lung Fields Breath Sounds diminished   PRE-TX-[id number]-ETCO2   O2 Device (Oxygen Therapy) nasal cannula   $ Is the patient on Low Flow Oxygen? Yes   Flow (L/min) 2   SpO2 98 %   Pulse Oximetry Type Intermittent   $ Pulse Oximetry - Multiple Charge Pulse Oximetry - Multiple   Pulse 83   Resp 18   Aerosol Therapy   $ Aerosol Therapy Charges PRN treatment not required   Respiratory Treatment Status PRN treatment not required   Duo Q4PRN, POX Q4, no tx required, vitals as charted.

## 2018-07-10 NOTE — CONSULTS
Kathia Astudillo [id number] is a 89 y.o. female who has been consulted for vancomycin dosing.    Vancomycin trough has been changed to 7/12 at 0000.      Patient will be followed by pharmacy for changes in renal function, toxicity, and efficacy.    Thank you for allowing us to participate in this patient's care.     Altagracia Cardoza, NerissaD

## 2018-07-10 NOTE — HPI
Pleasant 89 y/o female, who presents to the ED with her family d/t LLE swelling and color change.  She has a PMH of CHF, aortic stenosis, HTN, hypothyroidism, asthma, and DVT.  Family states that LLE has been swollen and weeping for the past few months.  Her daughter reports that they has an U/S performed which was negative for clot.  Today her daughter reports that her LLE was noted to be pink prior to her laying down for a nap.  Upon awaking roughly 3 hours later the LLE had gone from a pink to a xavier appearance.  They contacted her Hospice nurse who recommended going to the ED for evaluation.  She has been on hospice for the past 4 weeks r/t her CHF. She denies pain at this time, fever, chills, CP, worsening SOB, N/V/D, or  symptoms. Initial workup reveals afebrile, mild leukocytosis (12.8), lactic 2.9, Na 131, flat plate imaging suggestive of diffuse soft tissue edema of the LLE.  Blood cultures were obtained and she was started empirically on vancomycin and clindamycin for cellulitis of the LLE.

## 2018-07-10 NOTE — CONSULTS
Kathia Astudillo [id number] is a 89 y.o. female who has been consulted for vancomycin dosing.    The patient has the following labs:     Date Creatinine (mg/dl)    BUN WBC Count   7/10/2018 Estimated Creatinine Clearance: 44.3 mL/min (based on SCr of 0.9 mg/dL). Lab Results   Component Value Date    BUN 19 07/09/2018     Lab Results   Component Value Date    WBC 12.80 (H) 07/09/2018        Current weight is 100.7 kg (222 lb)      The patient received  2000 mg on 7/10 at 0004.    The patient will be started on vancomycin at a dose of 1500 mg every 24 hours.  The vancomycin trough has been ordered for 7/12 at 0000.  Target trough goal is 15 to 20.    Patient will be followed by pharmacy for changes in renal function, toxicity, and efficacy.   Thank you for allowing us to participate in this patient's care.     Aleksandr Sales

## 2018-07-10 NOTE — ED NOTES
Pt presents to ER for evaluation of left lower leg swelling and discoloration x4 hrs PTA. Pt started retaking coumadin x4 days ago and noticed these symptoms with no trauma suspected to leg. Pulses intact, pt able to wiggle toes, swelling, warmth and redness noted.

## 2018-07-10 NOTE — ASSESSMENT & PLAN NOTE
- recently placed under hospice care 4 weeks ago r/t her CHF  - code status and resuscitation goals discussed   - DNR order placed per request

## 2018-07-10 NOTE — SUBJECTIVE & OBJECTIVE
Past Medical History:   Diagnosis Date    Anticoagulant long-term use     Arthritis     Asthma     CHF (congestive heart failure)     Delayed wound healing     Diverticulitis     abscess that ruptured    DVT (deep venous thrombosis)     Hypertension     Thyroid disease        Past Surgical History:   Procedure Laterality Date    COLON SURGERY      colectomy with ostomy    GALLBLADDER SURGERY      SHIRAZ FILTER PLACEMENT      HYSTERECTOMY      SHOULDER SURGERY Left     rotator cuff repair    TONSILLECTOMY         Review of patient's allergies indicates:   Allergen Reactions    Pcn [penicillins] Swelling    Demerol (pf) [meperidine (pf)] Rash    Ibuprofen Rash       No current facility-administered medications on file prior to encounter.      Current Outpatient Prescriptions on File Prior to Encounter   Medication Sig    atenolol (TENORMIN) 25 MG tablet Take 1 tablet by mouth once daily.    DIPHENHYDRAMINE HCL (SLEEP ORAL) Take 1 tablet by mouth nightly as needed (Z-Quill).    levothyroxine (SYNTHROID) 100 MCG tablet Take 100 mcg by mouth before breakfast.    warfarin (COUMADIN) 5 MG tablet Take 3 mg by mouth once daily. Recent increase to 7.5 mg     Family History     None        Social History Main Topics    Smoking status: Never Smoker    Smokeless tobacco: Never Used    Alcohol use No    Drug use: No    Sexual activity: Not on file     Review of Systems   Constitutional: Negative for chills and fever.   HENT: Negative for congestion and sore throat.    Eyes: Negative for discharge and visual disturbance.   Respiratory: Negative for cough and chest tightness.    Cardiovascular: Positive for leg swelling. Negative for chest pain and palpitations.   Gastrointestinal: Negative for abdominal pain, diarrhea, nausea and vomiting.   Genitourinary: Negative for dysuria and frequency.   Musculoskeletal: Positive for gait problem and myalgias.   Skin: Positive for color change. Negative for  rash.   Neurological: Negative for seizures and syncope.     Objective:     Vital Signs (Most Recent):  Temp: 98.1 °F (36.7 °C) (07/09/18 2127)  Pulse: 76 (07/10/18 0032)  Resp: (!) 24 (07/09/18 2127)  BP: (!) 100/57 (07/10/18 0032)  SpO2: 100 % (07/10/18 0032) Vital Signs (24h Range):  Temp:  [98.1 °F (36.7 °C)] 98.1 °F (36.7 °C)  Pulse:  [76-92] 76  Resp:  [24] 24  SpO2:  [93 %-100 %] 100 %  BP: (100-121)/(57-59) 100/57     Weight: 100.7 kg (222 lb)  Body mass index is 40.6 kg/m².    Physical Exam   Constitutional: She is oriented to person, place, and time. She appears well-developed and well-nourished. No distress. Nasal cannula in place.   HENT:   Head: Normocephalic and atraumatic.   Eyes: EOM are normal. Pupils are equal, round, and reactive to light.   Neck: Normal range of motion. Neck supple.   Cardiovascular: Normal rate, regular rhythm and intact distal pulses.    Murmur heard.  Pulmonary/Chest: Effort normal and breath sounds normal. No respiratory distress.   Abdominal: Soft. Bowel sounds are normal. She exhibits no distension. There is no tenderness.   Ostomy noted   Musculoskeletal: Normal range of motion. She exhibits edema.   LLE weeping, warm to the touch, edematous, with erythema from foot to mid shin    Neurological: She is alert and oriented to person, place, and time.   Skin: Capillary refill takes more than 3 seconds. She is not diaphoretic. There is erythema.   Cool extremities, dusky nail beds     Psychiatric: She has a normal mood and affect. Her behavior is normal.   Nursing note and vitals reviewed.        CRANIAL NERVES     CN III, IV, VI   Pupils are equal, round, and reactive to light.  Extraocular motions are normal.        Significant Labs:   CBC:   Recent Labs  Lab 07/09/18  2231   WBC 12.80*   HGB 12.3   HCT 37.9   *     CMP:   Recent Labs  Lab 07/09/18  2310   *   K 4.7      CO2 17*      BUN 19   CREATININE 0.9   CALCIUM 8.4*   PROT 5.7*   ALBUMIN 3.0*    BILITOT 1.3*   ALKPHOS 50*   AST 28   ALT 20   ANIONGAP 11   EGFRNONAA 56*     Coagulation:   Recent Labs  Lab 07/09/18  2330   INR 2.2*     Lactic Acid:   Recent Labs  Lab 07/09/18  2231   LACTATE 2.9*       Significant Imaging: I have reviewed all pertinent imaging results/findings within the past 24 hours.

## 2018-07-10 NOTE — ASSESSMENT & PLAN NOTE
- presents with sudden color change to chronically edematous LLE  - afebrile, mild leukocytosis, lactic 2.9, XR suggestive of diffuse soft tissue swelling   - started empirically on vancomycin and clindamycin while in ED  - continue ABX for now - pharmacy to assist with dosing   - f/u on blood cultures and adjust therapy as indicated   - received 1L NS while in ED - repeat lactic pending   - f/u on lactic level and consider additional cautious fluid resuscitation given cardiac status   - elevate LLE while in bed  - PRN pain management    - monitor site closely and consider expert consultation if indicated

## 2018-07-10 NOTE — ASSESSMENT & PLAN NOTE
- duo nebs PRN SOB / wheezing  - supplemental oxygen as needed to maintain saturations greater than 92%

## 2018-07-10 NOTE — PLAN OF CARE
The pt stated that she lives at home with her daughter Brenna. She denies any 30 day admits. She has a rolling walker.Pharmacy of choice is [facility]. Verified PCP as Dr. Maciel and insurance as [employer]. The pt did not recall the name of the hospice company that she has been with the last 4 weeks for CHF. I told her that I would call her daughter. I spoke to Ms. Brenna David at [phone]. She stated that the pt is active with [facility] home services. I educated the pt on the blue discharge folder and wrote my name and number on the pts white board. Peggy Chavez [facility]      07/10/18 1211   Discharge Assessment   Assessment Type Discharge Planning Assessment   Confirmed/corrected address and phone number on facesheet? Yes   Assessment information obtained from? Patient;Caregiver   Communicated expected length of stay with patient/caregiver no   Prior to hospitilization cognitive status: Alert/Oriented   Prior to hospitalization functional status: Independent   Current cognitive status: Alert/Oriented   Current Functional Status: Independent   Lives With child(sonia), adult   Able to Return to Prior Arrangements yes   Is patient able to care for self after discharge? Yes   Who are your caregiver(s) and their phone number(s)? Brenna Álvarez [phone]   Readmission Within The Last 30 Days no previous admission in last 30 days   Patient currently being followed by outpatient case management? No   Patient currently receives any other outside agency services? No   Equipment Currently Used at Home cane, straight;walker, rolling   Do you have any problems affording any of your prescribed medications? No   Is the patient taking medications as prescribed? yes   Does the patient have transportation home? Yes   Transportation Available family or friend will provide   Does the patient receive services at the [facility]mad[facility] Clinic? No   Discharge Plan A Home with family;Hospice/home   Discharge Plan B Home with  family   Patient/Family In Agreement With Plan yes   Does the patient have transportation to healthcare appointments? Yes

## 2018-07-11 VITALS
HEART RATE: 83 BPM | HEIGHT: 62 IN | BODY MASS INDEX: 42.03 KG/M2 | RESPIRATION RATE: 18 BRPM | SYSTOLIC BLOOD PRESSURE: 120 MMHG | WEIGHT: 228.38 LBS | DIASTOLIC BLOOD PRESSURE: 72 MMHG | TEMPERATURE: 96 F | OXYGEN SATURATION: 98 %

## 2018-07-11 PROBLEM — R65.20 SEVERE SEPSIS: Status: ACTIVE | Noted: 2018-07-10

## 2018-07-11 LAB
ALBUMIN SERPL BCP-MCNC: 2.8 G/DL
ALP SERPL-CCNC: 57 U/L
ALT SERPL W/O P-5'-P-CCNC: 20 U/L
ANION GAP SERPL CALC-SCNC: 9 MMOL/L
AST SERPL-CCNC: 21 U/L
BASOPHILS # BLD AUTO: 0 K/UL
BASOPHILS NFR BLD: 0 %
BILIRUB SERPL-MCNC: 1 MG/DL
BUN SERPL-MCNC: 25 MG/DL
CALCIUM SERPL-MCNC: 8.6 MG/DL
CHLORIDE SERPL-SCNC: 101 MMOL/L
CO2 SERPL-SCNC: 23 MMOL/L
CREAT SERPL-MCNC: 1.1 MG/DL
DIFFERENTIAL METHOD: ABNORMAL
EOSINOPHIL # BLD AUTO: 0 K/UL
EOSINOPHIL NFR BLD: 0.4 %
ERYTHROCYTE [DISTWIDTH] IN BLOOD BY AUTOMATED COUNT: 15.7 %
EST. GFR  (AFRICAN AMERICAN): 50 ML/MIN/1.73 M^2
EST. GFR  (NON AFRICAN AMERICAN): 44 ML/MIN/1.73 M^2
GLUCOSE SERPL-MCNC: 71 MG/DL
HCT VFR BLD AUTO: 33 %
HGB BLD-MCNC: 11 G/DL
INR PPP: 3.2
LYMPHOCYTES # BLD AUTO: 0.6 K/UL
LYMPHOCYTES NFR BLD: 5.7 %
MAGNESIUM SERPL-MCNC: 1.8 MG/DL
MCH RBC QN AUTO: 29.7 PG
MCHC RBC AUTO-ENTMCNC: 33.3 G/DL
MCV RBC AUTO: 89 FL
MONOCYTES # BLD AUTO: 0.7 K/UL
MONOCYTES NFR BLD: 6.4 %
NEUTROPHILS # BLD AUTO: 9.6 K/UL
NEUTROPHILS NFR BLD: 87.5 %
PHOSPHATE SERPL-MCNC: 3.4 MG/DL
PLATELET # BLD AUTO: 110 K/UL
PMV BLD AUTO: 9.1 FL
POTASSIUM SERPL-SCNC: 4 MMOL/L
PROT SERPL-MCNC: 5.8 G/DL
PROTHROMBIN TIME: 32.2 SEC
RBC # BLD AUTO: 3.7 M/UL
SODIUM SERPL-SCNC: 133 MMOL/L
WBC # BLD AUTO: 10.9 K/UL

## 2018-07-11 PROCEDURE — 85025 COMPLETE CBC W/AUTO DIFF WBC: CPT

## 2018-07-11 PROCEDURE — S0077 INJECTION, CLINDAMYCIN PHOSP: HCPCS | Performed by: NURSE PRACTITIONER

## 2018-07-11 PROCEDURE — 25000003 PHARM REV CODE 250: Performed by: NURSE PRACTITIONER

## 2018-07-11 PROCEDURE — 25000003 PHARM REV CODE 250: Performed by: EMERGENCY MEDICINE

## 2018-07-11 PROCEDURE — 85610 PROTHROMBIN TIME: CPT

## 2018-07-11 PROCEDURE — G0378 HOSPITAL OBSERVATION PER HR: HCPCS

## 2018-07-11 PROCEDURE — 99900035 HC TECH TIME PER 15 MIN (STAT)

## 2018-07-11 PROCEDURE — 84100 ASSAY OF PHOSPHORUS: CPT

## 2018-07-11 PROCEDURE — 36415 COLL VENOUS BLD VENIPUNCTURE: CPT

## 2018-07-11 PROCEDURE — 94761 N-INVAS EAR/PLS OXIMETRY MLT: CPT

## 2018-07-11 PROCEDURE — 99217 PR OBSERVATION CARE DISCHARGE: CPT | Mod: GW,,, | Performed by: INTERNAL MEDICINE

## 2018-07-11 PROCEDURE — 27000221 HC OXYGEN, UP TO 24 HOURS

## 2018-07-11 PROCEDURE — 80053 COMPREHEN METABOLIC PANEL: CPT

## 2018-07-11 PROCEDURE — 83735 ASSAY OF MAGNESIUM: CPT

## 2018-07-11 PROCEDURE — 63600175 PHARM REV CODE 636 W HCPCS: Performed by: EMERGENCY MEDICINE

## 2018-07-11 RX ORDER — WARFARIN SODIUM 5 MG/1
3 TABLET ORAL DAILY
Start: 2018-07-12

## 2018-07-11 RX ADMIN — LEVOTHYROXINE SODIUM 100 MCG: 100 TABLET ORAL at 05:07

## 2018-07-11 RX ADMIN — CLINDAMYCIN IN 5 PERCENT DEXTROSE 600 MG: 12 INJECTION, SOLUTION INTRAVENOUS at 05:07

## 2018-07-11 RX ADMIN — CLINDAMYCIN IN 5 PERCENT DEXTROSE 600 MG: 12 INJECTION, SOLUTION INTRAVENOUS at 12:07

## 2018-07-11 RX ADMIN — ATENOLOL 25 MG: 25 TABLET ORAL at 09:07

## 2018-07-11 RX ADMIN — VANCOMYCIN HYDROCHLORIDE 1500 MG: 1 INJECTION, POWDER, LYOPHILIZED, FOR SOLUTION INTRAVENOUS at 01:07

## 2018-07-11 NOTE — PLAN OF CARE
Problem: Patient Care Overview  Goal: Plan of Care Review  Outcome: Ongoing (interventions implemented as appropriate)  Pt alert and oriented. Vitals stable. Ambulated to restroom. Free from falls. Ostomy bag changed. Family at bedside. Adequate for discharge. Will continue to monitor.

## 2018-07-11 NOTE — PLAN OF CARE
I spoke to Angela at [facility] [phone]- she confirmed that the pt is active with their in home services. She asked me to send discharge orders. I updated her that I had sent a 3 day packet for review and will send orders once they are received. Peggy Chavez LMSW     1:54- Discharge orders and AVS sent to [facility] via [facility]. Peggy Chavez LMSW     2:08- I sent the hospice consult to [facility] via [facility] and selected them as the pts discharge destination. Peggy Chavez LMSW         07/11/18 1349   Discharge Reassessment   Assessment Type Discharge Planning Assessment

## 2018-07-11 NOTE — PLAN OF CARE
Problem: Patient Care Overview  Goal: Plan of Care Review  Outcome: Ongoing (interventions implemented as appropriate)  Patient alert and oriented resting in bed. NAD. Denies pain or SOB. VSS. O2@ 3LNc. Normal Sr/Sb. Up with assistance to bathroom.  Plan of care reviewed with patient. Verbalizes understanding.Call light in reach. Pt free from fall or injury. Will monitor.

## 2018-07-11 NOTE — DISCHARGE SUMMARY
Discharge Summary  Hospital Medicine    Admit Date: 7/9/2018    Date and Time: 7/11/20181:49 PM    Discharge Attending Physician: Sindy Mike MD    Primary Care Physician: Robel Maciel MD    Diagnoses:  Active Hospital Problems    Diagnosis  POA    *Cellulitis of left lower extremity [L03.116]  Yes    CHF (congestive heart failure) [I50.9]  Yes    History of falling [Z91.81]  Not Applicable    DNR (do not resuscitate) [Z66]  Yes    History of DVT (deep vein thrombosis) [Z86.718]  Not Applicable    Pain and swelling of left lower leg [M79.662, M79.89]  Yes    Asthma [J45.909]  Yes    Hypertension [I10]  Yes    Hypothyroid [E03.9]  Yes    Colostomy status [Z93.3]  Not Applicable      Resolved Hospital Problems    Diagnosis Date Resolved POA   No resolved problems to display.     Discharged Condition: Good    Hospital Course:   Pleasant 89 y/o female, who presented to the ED with her family d/t LLE swelling and color change.  She has a PMH of CHF, aortic stenosis, HTN, hypothyroidism, asthma, and DVT.  Family stated that LLE had been swollen and weeping for the past few months.  Her daughter reported that they has an U/S performed which was negative for clot.  Her daughter reported that her LLE was noted to be pink prior to her laying down for a nap.  Upon awaking roughly 3 hours later the LLE had gone from a pink to a xavier appearance.  They contacted her Hospice nurse who recommended going to the ED for evaluation.  She has been on hospice for the past 4 weeks r/t her CHF. She denied pain at this time, fever, chills, CP, worsening SOB, N/V/D, or  symptoms. Initial workup reveals afebrile, mild leukocytosis (12.8), lactic 2.9, Na 131, flat plate imaging suggestive of diffuse soft tissue edema of the LLE.  Blood cultures were obtained and she was started empirically on vancomycin and clindamycin for cellulitis of the LLE. Patient was admitted to Hospitalist medicine service. Patient was started on IV  antibiotics. Patient encouraged to elevate extremity. Routine wound care continued. Patient's symptoms improved and patient transitioned to PO antibiotics. Patient to resume home hospice. Patient was discharged home in stable condition with following discharge plan of care.     Consults: None    Significant Diagnostic Studies:   Left leg x-ray; DJD at the left knee and at the ankle mortise joint.  No acute fracture seen.  Soft tissue swelling of the entire lower leg.  Atherosclerosis.    Microbiology Results (last 7 days)     Procedure Component Value Units Date/Time    Blood culture x two cultures. Draw prior to antibiotics. [[id number]] Collected:  07/09/18 2231    Order Status:  Completed Specimen:  Blood Updated:  07/11/18 1212     Blood Culture, Routine No Growth to date     Blood Culture, Routine No Growth to date    Narrative:       Aerobic and anaerobic    Blood culture x two cultures. Draw prior to antibiotics. [[id number]] Collected:  07/09/18 2231    Order Status:  Completed Specimen:  Blood Updated:  07/11/18 1212     Blood Culture, Routine No Growth to date     Blood Culture, Routine No Growth to date    Narrative:       Aerobic and anaerobic        Special Treatments/Procedures: None  Disposition: Home Hospice    Medications:  Reconciled Home Medications: Current Discharge Medication List      CONTINUE these medications which have CHANGED    Details   warfarin (COUMADIN) 5 MG tablet Take 0.5 tablets (2.5 mg total) by mouth Daily. Recent increase to 7.5 mg         CONTINUE these medications which have NOT CHANGED    Details   atenolol (TENORMIN) 25 MG tablet Take 1 tablet by mouth once daily.      DIPHENHYDRAMINE HCL (SLEEP ORAL) Take 1 tablet by mouth nightly as needed (Z-Quill).      levothyroxine (SYNTHROID) 100 MCG tablet Take 100 mcg by mouth before breakfast.         STOP taking these medications       ADVAIR DISKUS 250-50 mcg/dose diskus inhaler Comments:   Reason for Stopping:          methylPREDNISolone (MEDROL DOSEPACK) 4 mg tablet Comments:   Reason for Stopping:               Discharge Procedure Orders  Diet Cardiac   Scheduling Instructions: Coumadin diet restrictions     Call MD for:   Order Comments: For worsening symptoms, chest pain, shortness of breath, increased abdominal pain, high grade fever, stroke or stroke like symptoms, immediately go to the nearest Emergency Room or call 911 as soon as possible.     Other restrictions (specify):   Scheduling Instructions: Keep left leg elevated       Follow-up Information     [facility].    Specialty:  Hospice Services  Why:  Hospice  Contact information:  [id number]  [place] [id number] [phone]             Robel Maciel MD.    Specialty:  Family Medicine  Contact information:  [address] [address]  [phone]             Please follow up.    Contact information:  Check INR in Am and resume INR monitoring as before.            Please follow up.    Contact information:  Hold Coumadin tonight

## 2018-07-12 NOTE — PLAN OF CARE
07/12/18 0800   Final Note   Assessment Type Final Discharge Note   [facility]

## 2018-07-12 NOTE — PLAN OF CARE
Spoke with pt's daughter, Brenna they were given another pt's prescription at discharge and do not have a prescription for abx. I spoke with Dr Mike and called in Clindamycin 300mg po q8 x10 days to [facility] on [address] (ph#[phone]). Brenna notified of the above....JESSY Kaur       07/12/18 7204   Discharge Reassessment   Assessment Type Discharge Planning Reassessment

## 2018-07-15 LAB
BACTERIA BLD CULT: NORMAL
BACTERIA BLD CULT: NORMAL

## 2018-09-22 ENCOUNTER — HOSPITAL ENCOUNTER (EMERGENCY)
Facility: HOSPITAL | Age: 83
Discharge: HOME | End: 2018-09-22
Attending: EMERGENCY MEDICINE
Payer: MEDICARE

## 2018-09-22 VITALS
BODY MASS INDEX: 41.96 KG/M2 | OXYGEN SATURATION: 100 % | HEIGHT: 62 IN | SYSTOLIC BLOOD PRESSURE: 119 MMHG | RESPIRATION RATE: 16 BRPM | TEMPERATURE: 98 F | WEIGHT: 228 LBS | DIASTOLIC BLOOD PRESSURE: 76 MMHG | HEART RATE: 80 BPM

## 2018-09-22 DIAGNOSIS — S01.81XA FACIAL LACERATION, INITIAL ENCOUNTER: Primary | ICD-10-CM

## 2018-09-22 PROCEDURE — 12011 RPR F/E/E/N/L/M 2.5 CM/<: CPT

## 2018-09-22 PROCEDURE — 99284 EMERGENCY DEPT VISIT MOD MDM: CPT | Mod: 25

## 2018-09-23 NOTE — ED PROVIDER NOTES
Encounter Date: 9/22/2018    SCRIBE #1 NOTE: Mirtha BOYD, kandace scribing for, and in the presence of, Dr. Baljit Juan.       History     Chief Complaint   Patient presents with    Fall       Time seen by provider: 7:04 PM on 09/22/2018    Kathia Astudillo is a 89 y.o. female with PMHx of HTN, thyroid disease, asthma, DVT, and CHF who presents to the ED via for an evaluation of a facial wound secondary to a mechanical fall that occurred immediately PTA. Patient reports getting up from her recliner when she fell and hit near her right eye on the corner of her coffee table. Patient is on home hospice care. Patient denies LOC. She is currently on Coumadin. Per EMS, patient has chronic wheezing. Patient denies any other pain. No other medical concerns or complaints at this moment. SHx includes hysterectomy, colon surgery, and left shoulder surgery. Penicillin, demerol, and ibuprofen allergy noted.       The history is provided by the patient.     Review of patient's allergies indicates:   Allergen Reactions    Pcn [penicillins] Swelling    Demerol (pf) [meperidine (pf)] Rash    Ibuprofen Rash     Past Medical History:   Diagnosis Date    Anticoagulant long-term use     Arthritis     Asthma     CHF (congestive heart failure)     Delayed wound healing     Diverticulitis     abscess that ruptured    DVT (deep venous thrombosis)     Hypertension     Thyroid disease      Past Surgical History:   Procedure Laterality Date    COLECTOMY-PARTIAL  5/3/2015    [facility]    COLON SURGERY      colectomy with ostomy    COLOSTOMY  5/3/2015    [facility] OR    GALLBLADDER SURGERY      SHIRAZ FILTER PLACEMENT      HYSTERECTOMY      RESECTION-COLON AND RECTAL-LOW ANTERIOR N/A 5/3/2015    [facility] OR    SHOULDER SURGERY Left     rotator cuff repair    TONSILLECTOMY       Family History   Problem Relation Age of Onset     Diabetes Mother     Asthma Father     Heart disease Sister     Heart disease Brother     Asthma Brother     Collagen disease Neg Hx      Social History     Tobacco Use    Smoking status: Never Smoker    Smokeless tobacco: Never Used   Substance Use Topics    Alcohol use: No    Drug use: No     Review of Systems   Eyes: Positive for pain. Negative for visual disturbance.   Respiratory: Positive for wheezing (chronic).    Musculoskeletal: Negative for arthralgias, myalgias and neck pain.   Skin: Positive for wound (facial). Negative for color change.   Neurological: Negative for syncope and headaches.   Hematological: Bruises/bleeds easily.       Physical Exam     Initial Vitals [09/22/18 1853]   BP Pulse Resp Temp SpO2   119/76 80 16 97.7 °F (36.5 °C) 100 %      MAP       --         Physical Exam    Nursing note and vitals reviewed.  Constitutional: She appears well-developed and well-nourished. She is not diaphoretic.  Non-toxic appearance. She does not have a sickly appearance. She does not appear ill. No distress.   HENT:   2 cm x 2 cm high parietal hematoma.   Eyes: EOM are normal. Pupils are equal, round, and reactive to light.   Patient has a skin tear between the medial canthus and the bridge of her nose.  The lower punctum is intact.  There does not appear to be a laceration to the lower eyelid.  There is no laceration to the eyelid between the medial canthus and the lower punctum.   Neck: Normal range of motion. Neck supple.   Cardiovascular: Normal rate, regular rhythm and normal heart sounds. Exam reveals no gallop and no friction rub.    No murmur heard.  Pulmonary/Chest: No respiratory distress. She has wheezes (faint). She has no rhonchi. She has no rales.   Musculoskeletal: Normal range of motion.   Neurological: She is alert and oriented to person, place, and time.   Skin: Skin is warm and dry.   Skin tear to the medial aspect of the right eye.    Psychiatric: She has a normal mood and  affect. Her behavior is normal. Judgment and thought content normal.         ED Course   Lac Repair  Date/Time: 9/22/2018 8:52 PM  Performed by: Baljit Juan MD  Authorized by: Baljit Juan MD   Consent Done: Not Needed  Body area: head/neck (Between the medial canthus and the bridge of her nose)  Laceration length: 1.5 cm  Foreign bodies: no foreign bodies  Tendon involvement: none  Nerve involvement: none  Vascular damage: no  Skin closure: glue        Labs Reviewed - No data to display       Imaging Results    None          Medical Decision Making:   History:   Old Medical Records: I decided to obtain old medical records.            Scribe Attestation:   Scribe #1: I performed the above scribed service and the documentation accurately describes the services I performed. I attest to the accuracy of the note.      I, Dr. Juan, personally performed the services described in this documentation. All medical record entries made by the scribe were at my direction and in my presence.  I have reviewed the chart and agree that the record reflects my personal performance and is accurate and complete.8:52 PM 09/22/2018              ED Course as of Sep 22 1936   Sat Sep 22, 2018   1903 BP: 119/76 [EF]   1903 Temp: 97.7 °F (36.5 °C) [EF]   1903 Temp src: Oral [EF]   1903 Pulse: 80 [EF]   1903 Resp: 16 [EF]   1903 SpO2: 100 % [EF]   1928 Daughter Dang arrives and states she does not want the patient to have any labs or imaging done.  Patient is DNR and on hospice.  She does have a laceration to the medial right eye that does not appear to violate the tear duct.  No apparent ocular injury. Laceration repaired with Dermabond.  Patient will be discharged home.    [EF]      ED Course User Index  [EF] Baljit Juan MD     Clinical Impression:   The encounter diagnosis was Facial laceration, initial encounter.      Disposition:   Disposition: Discharged  Condition: Stable      89-year-old female with terminal CHF  DNR on hospice presents with a skin tear to her face.  Daughter does not wish for the patient have any imaging or any labs.  I see no apparent laceration to the eyelid.  I doubt any involvement of the tear duct.  I see no indication to involve Ophthalmology.  At any rate the daughter states the patient has been told she is unlikely to survive any sort of operation.  Daughter does not wish for any care other than treatment of the skin tear secondary to some bleeding.  No sutures necessary.  Wound was Dermabond.  Hemostatic.  Discharge home with her daughter.                          Baljit Juan MD  09/22/18 9127